# Patient Record
Sex: MALE | Race: BLACK OR AFRICAN AMERICAN | NOT HISPANIC OR LATINO | Employment: OTHER | URBAN - METROPOLITAN AREA
[De-identification: names, ages, dates, MRNs, and addresses within clinical notes are randomized per-mention and may not be internally consistent; named-entity substitution may affect disease eponyms.]

---

## 2019-08-07 ENCOUNTER — OFFICE VISIT (OUTPATIENT)
Dept: URGENT CARE | Facility: URGENT CARE | Age: 44
End: 2019-08-07

## 2019-08-07 VITALS
RESPIRATION RATE: 12 BRPM | DIASTOLIC BLOOD PRESSURE: 70 MMHG | HEART RATE: 70 BPM | TEMPERATURE: 98.1 F | BODY MASS INDEX: 26.82 KG/M2 | SYSTOLIC BLOOD PRESSURE: 110 MMHG | HEIGHT: 72 IN | WEIGHT: 198 LBS

## 2019-08-07 DIAGNOSIS — L02.433: ICD-10-CM

## 2019-08-07 DIAGNOSIS — L02.439 CARBUNCLE OF AXILLA: Primary | ICD-10-CM

## 2019-08-07 DIAGNOSIS — L81.0 POST-INFLAMMATORY HYPERPIGMENTATION: ICD-10-CM

## 2019-08-07 DIAGNOSIS — L02.434 CARBUNCLE OF ARM, LEFT: ICD-10-CM

## 2019-08-07 PROCEDURE — 99213 OFFICE O/P EST LOW 20 MIN: CPT | Performed by: INTERNAL MEDICINE

## 2019-08-07 RX ORDER — SULFAMETHOXAZOLE/TRIMETHOPRIM 800-160 MG
1 TABLET ORAL 2 TIMES DAILY
Qty: 42 TABLET | Refills: 0 | Status: SHIPPED | OUTPATIENT
Start: 2019-08-07 | End: 2020-02-04

## 2019-08-07 ASSESSMENT — ENCOUNTER SYMPTOMS: FEVER: 0

## 2019-08-07 ASSESSMENT — MIFFLIN-ST. JEOR: SCORE: 1831.12

## 2019-08-08 NOTE — PROGRESS NOTES
SUBJECTIVE:   Milan Escobar is a 43 year old male presenting with a chief complaint of   Chief Complaint   Patient presents with     Urgent Care     Derm Problem     concern of staph infection in both arms.        He is a new patient of [facility].        Onset of symptoms was 2-3 month(s) ago.    Location: arms, axillae  Bumps/lesions  Context:  Initially he thought his skin problems on his arms were related to laundry as his dryer broke    His girlfriend has had MRSA infections  His girlfriend was seen in clinic last week for axillary and vulvar carbuncles felt due to MRSA as she had MRSA from hospital stay.    Discussed at appointment that MRSA can spread in the household.  They were  given information on MRSA staph and how to prevent spread in household.  He then felt his arm lesions were most likely caught from his girlfriend  He took 4 days of Bactrim antibiotics from her supply and lesions on arms had much improved.   He also had bumps in the right axilla     Current and Associated symptoms: redness and pain  Scarring lesions on arms        Review of Systems   Constitutional: Negative for fever.       Past Medical History:   Diagnosis Date     Acne      No family history on file.  Current Outpatient Medications   Medication Sig Dispense Refill     sulfamethoxazole-trimethoprim (BACTRIM DS/SEPTRA DS) 800-160 MG tablet Take 1 tablet by mouth 2 times daily for 21 days 42 tablet 0     ALBUTEROL SULFATE 108 (90 BASE) MCG/ACT IN AERS 2 puff q 4-6 hours PRN wheezing 1 0     Finasteride, [id number], (PROPECIA PO) Take  by mouth.       isotretinoin (ACCUTANE) 40 MG capsule Take 2 capsules by mouth daily.       neomycin-bacitracin-polymyxin (NEOSPORIN) ophthalmic ointment Apply to L eye before bed 3.5 g 0     Social History     Tobacco Use     Smoking status: Former Smoker     Last attempt to quit: 2012     Years since quittin.5     Smokeless tobacco: Never Used   Substance Use Topics     Alcohol use: Not on  file       OBJECTIVE  /70   Pulse 70   Temp 98.1  F (36.7  C) (Oral)   Resp 12   Ht 1.829 m (6')   Wt 89.8 kg (198 lb)   BMI 26.85 kg/m      Physical Exam   Constitutional: He appears well-developed and well-nourished.   Skin:   Right axilla  4 tender papules, 2 are red and have broken open on their own.  Bilateral arms show multiple hyperpigmented healing lesions, scars and excoriations all circular from 1 to 3 cm in size   Vitals reviewed.      Labs:  No results found for this or any previous visit (from the past 24 hour(s)).        ASSESSMENT:      ICD-10-CM    1. Carbuncle of axilla L02.439 sulfamethoxazole-trimethoprim (BACTRIM DS/SEPTRA DS) 800-160 MG tablet   2. Carbuncle of arm, left L02.434 sulfamethoxazole-trimethoprim (BACTRIM DS/SEPTRA DS) 800-160 MG tablet   3. Post-inflammatory hyperpigmentation L81.0 sulfamethoxazole-trimethoprim (BACTRIM DS/SEPTRA DS) 800-160 MG tablet   4. Carbuncle of arm, right L02.433 sulfamethoxazole-trimethoprim (BACTRIM DS/SEPTRA DS) 800-160 MG tablet        Medical Decision Making:  Most likely patient caught MRSA staph infection from his girlfriend.  He was given a long course of antibiotics to eradicate infection.  Encouraged probiotics/yogurt to prevent resistance  Discussed bleach and chlorhexidine baths once infection seems cleared and full house cleaning with bleach    Patient Instructions           Patient Education     Staph Infection (MRSA)  Staph is the short name for the common bacteria called staphylococcus aureus. Staph bacteria are often present on the skin without causing an infection. If it gets inside the skin, an infection occurs. This causes redness, tenderness, swelling, and sometimes fluid drainage.  MRSA stands for methicillin-resistant staph aureus. Unlike a common staph infection, MRSA bacteria are resistant to the usual antibiotics and harder to treat. Also, MRSA can cause more troublesome and recurrent skin infections than common staph  bacteria. It is also more likely to spread throughout the body and cause a life-threatening illness, though this is unusual.  MRSA is spread to others by direct physical contact with the bacteria. MRSA can also be spread from items contaminated by a person who has the bacteria, such as bandages, towels, bed sheets, hard surfaces, or sports equipment. It is generally not spread through the air.  But you can get it if you come in direct contact with the fluid from someone's cough or sneeze.  Once you have a MRSA skin infection, you are at risk of having it again.  If your healthcare provider thinks you have a MRSA infection, he or she may take a wound culture to confirm the diagnosis. If you have an abscess, your provider may drain it. He or she may prescribe one or more antibiotics that work against MRSA and may recommend that you clean your skin, the skin of your closest contacts, and things that you touch or wear to get rid of chronic MRSA infection at these sites.  Home care    Take any antibiotics prescribed exactly as directed. Don't stop taking them until they are gone or your healthcare provider tells you to stop, even if you feel better.    If your healthcare provider prescribed disinfecting washes (such as chlorhexidine 4% soap) or antibiotic ointment, use it as directed.    Cover your wounds with clean, dry bandages. Change dressings as they become soiled. Wash your hands well each time you change the bandage or touch the wound.    Remove any artificial nails and nail polish.  Treating household members and your environment  If you have been diagnosed with possible MRSA infection, those living with you are at higher risk of carrying the bacteria on their skin or in their nose, even if there is no sign of infection. Bacteria must be removed from the skin of all household members at the same time so it is not passed back and forth. Advise them to remove the bacteria as follows:    Household member should wash  with chlorhexidine 4% soap as well.    If anyone in the household has a skin infection, it must be treated by a healthcare provider.    Clean counter tops, other hard surfaces that you contact, and children's toys.    Don't share personal items such as toothbrush and razors.  Preventing spread of infection    Wash your hands often with plain soap and warm water. Be sure to clean under the fingernails, between the fingers, and the wrists. Dry hands with a single use towel (for example a paper towel). If soap and water are not available, you can use an alcohol-based hand . Rub the  over the entire surface of the hands, fingers, and wrists until dry.    Don't share personal items such as towels, washcloths, razors, clothing, or uniforms. Wash soiled sheets, towels or clothes in hot water with laundry detergent. Use an automatic clothes dryer set on high to kill any remaining bacteria.    If you use a gym, wipe down equipment with an alcohol-based  before and after each use.  Wipe the handgrips as well.    If you participate in sports, shower with plain soap after every activity. Use a clean towel for each shower.  Follow-up care  Follow-up with your healthcare provider, or as advised. If a wound culture was taken, call as directed for the results. You will be told about any changes to your treatment.  If you are diagnosed with MRSA, tell medical personnel in the future that you have been treated for this type of infection.  When to seek medical advice  Call your healthcare provider if any of the following occur:    Increasing redness, swelling or pain    Red streaks in the skin around the wound    Weakness or dizziness    New appearance of pus or drainage from the wound    New fever over 100.4  F (38.0  C), or as directed by the healthcare provider  Date Last Reviewed: 4/1/2018 2000-2018 [employer]. [address] [place] [address], [place] [address]. All rights reserved. This  information is not intended as a substitute for professional medical care. Always follow your healthcare professional's instructions.           Patient Education     Understanding Carbuncles    A carbuncle is a painful boil under the skin. It happens when a group of hair follicles become infected. Follicles are the tiny holes from which hair grows out of your skin.  How to say it  Danny   What causes carbuncles?  A carbuncle is caused by an infection with the bacteria Staphylococcus aureus. They are common on areas of the body where friction and sweat occur. They usually appear on the back of the neck, back, and thighs. This type of infection can also happen when the skin is injured, such as by a cut or bug bite.  The bacteria that causes carbuncles can spread easily from person to person. People at higher risk for boils are those with diabetes or a weak immune system. Drug users who use needles are also more likely to get them.  Symptoms of carbuncles  A carbuncle starts as a small painful bump. But it can grow quickly. It may become:    Red    Swollen    Tender  Carbuncles may ooze pus. They may also cause fever and a general feeling of illness.  Treatment for carbuncles  A carbuncle may heal on its own in a few weeks. But the pus within it needs to come out first. Treatment options include:    Warm compress. Putting a warm, wet washcloth on the boil will help the pus drain out. You should never try to pop a boil. That can cause the infection to spread.    Surgical drainage. If the boil doesn t drain on its own, your healthcare provider may need to cut into it.    Antibiotics. In some cases, oral antibiotics may be prescribed to fight the infection, especially if the carbuncle returns. You will likely have to take the medicine for 5 to 7 days. You may need to take it longer for a severe case.    Good hygiene. Proper handwashing can prevent boils from spreading and coming back. Also wash things that have  been in contact with the carbuncle in hot water. This includes items such as clothing and towels.  Complications of carbuncles  The main complication of a carbuncle is the spreading of the infection. The bacteria can infect the heart and bone. It can also lead to septic shock, an infection of the entire body.  When to call your healthcare provider  Call your healthcare provider right away if you have any of these:    Fever of 100.4 F (38 C) or higher, or as directed    Redness, swelling, or fluid leaking from a carbuncle that gets worse    Pain that gets worse    Symptoms that don t get better, or get worse    New symptoms   Date Last Reviewed: 5/1/2016 2000-2018 [employer]. [address] [place] [address], [place] [address]. All rights reserved. This information is not intended as a substitute for professional medical care. Always follow your healthcare professional's instructions.

## 2019-08-08 NOTE — PATIENT INSTRUCTIONS
Patient Education     Staph Infection (MRSA)  Staph is the short name for the common bacteria called staphylococcus aureus. Staph bacteria are often present on the skin without causing an infection. If it gets inside the skin, an infection occurs. This causes redness, tenderness, swelling, and sometimes fluid drainage.  MRSA stands for methicillin-resistant staph aureus. Unlike a common staph infection, MRSA bacteria are resistant to the usual antibiotics and harder to treat. Also, MRSA can cause more troublesome and recurrent skin infections than common staph bacteria. It is also more likely to spread throughout the body and cause a life-threatening illness, though this is unusual.  MRSA is spread to others by direct physical contact with the bacteria. MRSA can also be spread from items contaminated by a person who has the bacteria, such as bandages, towels, bed sheets, hard surfaces, or sports equipment. It is generally not spread through the air.  But you can get it if you come in direct contact with the fluid from someone's cough or sneeze.  Once you have a MRSA skin infection, you are at risk of having it again.  If your healthcare provider thinks you have a MRSA infection, he or she may take a wound culture to confirm the diagnosis. If you have an abscess, your provider may drain it. He or she may prescribe one or more antibiotics that work against MRSA and may recommend that you clean your skin, the skin of your closest contacts, and things that you touch or wear to get rid of chronic MRSA infection at these sites.  Home care    Take any antibiotics prescribed exactly as directed. Don't stop taking them until they are gone or your healthcare provider tells you to stop, even if you feel better.    If your healthcare provider prescribed disinfecting washes (such as chlorhexidine 4% soap) or antibiotic ointment, use it as directed.    Cover your wounds with clean, dry bandages. Change dressings as they  become soiled. Wash your hands well each time you change the bandage or touch the wound.    Remove any artificial nails and nail polish.  Treating household members and your environment  If you have been diagnosed with possible MRSA infection, those living with you are at higher risk of carrying the bacteria on their skin or in their nose, even if there is no sign of infection. Bacteria must be removed from the skin of all household members at the same time so it is not passed back and forth. Advise them to remove the bacteria as follows:    Household member should wash with chlorhexidine 4% soap as well.    If anyone in the household has a skin infection, it must be treated by a healthcare provider.    Clean counter tops, other hard surfaces that you contact, and children's toys.    Don't share personal items such as toothbrush and razors.  Preventing spread of infection    Wash your hands often with plain soap and warm water. Be sure to clean under the fingernails, between the fingers, and the wrists. Dry hands with a single use towel (for example a paper towel). If soap and water are not available, you can use an alcohol-based hand . Rub the  over the entire surface of the hands, fingers, and wrists until dry.    Don't share personal items such as towels, washcloths, razors, clothing, or uniforms. Wash soiled sheets, towels or clothes in hot water with laundry detergent. Use an automatic clothes dryer set on high to kill any remaining bacteria.    If you use a gym, wipe down equipment with an alcohol-based  before and after each use.  Wipe the handgrips as well.    If you participate in sports, shower with plain soap after every activity. Use a clean towel for each shower.  Follow-up care  Follow-up with your healthcare provider, or as advised. If a wound culture was taken, call as directed for the results. You will be told about any changes to your treatment.  If you are diagnosed with  MRSA, tell medical personnel in the future that you have been treated for this type of infection.  When to seek medical advice  Call your healthcare provider if any of the following occur:    Increasing redness, swelling or pain    Red streaks in the skin around the wound    Weakness or dizziness    New appearance of pus or drainage from the wound    New fever over 100.4  F (38.0  C), or as directed by the healthcare provider  Date Last Reviewed: 4/1/2018 2000-2018 [employer]. [address] [place] [address]. All rights reserved. This information is not intended as a substitute for professional medical care. Always follow your healthcare professional's instructions.           Patient Education     Understanding Carbuncles    A carbuncle is a painful boil under the skin. It happens when a group of hair follicles become infected. Follicles are the tiny holes from which hair grows out of your skin.  How to say it  Danny   What causes carbuncles?  A carbuncle is caused by an infection with the bacteria Staphylococcus aureus. They are common on areas of the body where friction and sweat occur. They usually appear on the back of the neck, back, and thighs. This type of infection can also happen when the skin is injured, such as by a cut or bug bite.  The bacteria that causes carbuncles can spread easily from person to person. People at higher risk for boils are those with diabetes or a weak immune system. Drug users who use needles are also more likely to get them.  Symptoms of carbuncles  A carbuncle starts as a small painful bump. But it can grow quickly. It may become:    Red    Swollen    Tender  Carbuncles may ooze pus. They may also cause fever and a general feeling of illness.  Treatment for carbuncles  A carbuncle may heal on its own in a few weeks. But the pus within it needs to come out first. Treatment options include:    Warm compress. Putting a warm, wet washcloth on the  boil will help the pus drain out. You should never try to pop a boil. That can cause the infection to spread.    Surgical drainage. If the boil doesn t drain on its own, your healthcare provider may need to cut into it.    Antibiotics. In some cases, oral antibiotics may be prescribed to fight the infection, especially if the carbuncle returns. You will likely have to take the medicine for 5 to 7 days. You may need to take it longer for a severe case.    Good hygiene. Proper handwashing can prevent boils from spreading and coming back. Also wash things that have been in contact with the carbuncle in hot water. This includes items such as clothing and towels.  Complications of carbuncles  The main complication of a carbuncle is the spreading of the infection. The bacteria can infect the heart and bone. It can also lead to septic shock, an infection of the entire body.  When to call your healthcare provider  Call your healthcare provider right away if you have any of these:    Fever of 100.4 F (38 C) or higher, or as directed    Redness, swelling, or fluid leaking from a carbuncle that gets worse    Pain that gets worse    Symptoms that don t get better, or get worse    New symptoms   Date Last Reviewed: 5/1/2016 2000-2018 [employer]. [address] [place] [address], [place] [address]. All rights reserved. This information is not intended as a substitute for professional medical care. Always follow your healthcare professional's instructions.

## 2019-08-22 ENCOUNTER — TELEPHONE (OUTPATIENT)
Dept: URGENT CARE | Facility: URGENT CARE | Age: 44
End: 2019-08-22

## 2019-08-23 NOTE — TELEPHONE ENCOUNTER
Pt called requesting his Bactrim Rx be extended for a fourth week (he's just started 3rd week of course) and that he also be given a topical antibiotic to apply to lesions.  States lesions are larger, protruding more and some have been draining a bit.  Denies fever.      Dr. Guillory said pt needs to be seen again since lesions sound worse and pt may need to be switched to another antibiotic or even may need IV antibiotics.  Recommended pt be seen this evening or ASAP; also recommended pt take a picture of the lesions this evening for comparison if ne's unable to come in till tomorrow in UC.    I communicated all this to pt and he stated he understood and would try to come in this evening or definitely by tomorrow.    Regla Flores RN

## 2019-09-05 ENCOUNTER — OFFICE VISIT (OUTPATIENT)
Dept: URGENT CARE | Facility: URGENT CARE | Age: 44
End: 2019-09-05

## 2019-09-05 VITALS
HEART RATE: 88 BPM | DIASTOLIC BLOOD PRESSURE: 81 MMHG | WEIGHT: 198 LBS | SYSTOLIC BLOOD PRESSURE: 132 MMHG | TEMPERATURE: 98.1 F | BODY MASS INDEX: 26.85 KG/M2 | OXYGEN SATURATION: 99 %

## 2019-09-05 DIAGNOSIS — L73.9 FOLLICULITIS: Primary | ICD-10-CM

## 2019-09-05 DIAGNOSIS — Z20.818 MRSA EXPOSURE: ICD-10-CM

## 2019-09-05 PROCEDURE — 99213 OFFICE O/P EST LOW 20 MIN: CPT | Performed by: FAMILY MEDICINE

## 2019-09-05 RX ORDER — DOXYCYCLINE 100 MG/1
100 CAPSULE ORAL 2 TIMES DAILY
Qty: 60 CAPSULE | Refills: 1 | Status: SHIPPED | OUTPATIENT
Start: 2019-09-05 | End: 2020-02-04

## 2019-09-05 RX ORDER — MUPIROCIN 20 MG/G
OINTMENT TOPICAL 3 TIMES DAILY
Qty: 44 G | Refills: 1 | Status: SHIPPED | OUTPATIENT
Start: 2019-09-05 | End: 2020-02-04

## 2019-09-06 ENCOUNTER — NURSE TRIAGE (OUTPATIENT)
Dept: NURSING | Facility: CLINIC | Age: 44
End: 2019-09-06

## 2019-09-06 NOTE — TELEPHONE ENCOUNTER
"Patient states he was at [facility] yesterday and states \"I think I left my car keys there.\"    No triage completed.    Protocol-  Information Only- Adult  Care advice reviewed.   Disposition-  Information given.  Caller states understanding of the recommended disposition.   This RN spoke with Humaira (Lab) at [facility] to assist with Caller concern.   Advised Caller to go to Urgent Care today to follow up / identify keys found.  Advised to call back if further questions or concerns.     MARK GilliamN RN  [facility]     Reason for Disposition    General information question, no triage required and triager able to answer question    Protocols used: INFORMATION ONLY CALL-A-AH    "

## 2019-09-06 NOTE — PATIENT INSTRUCTIONS
Patient Education     Understanding Folliculitis  Folliculitis is when hair follicles become inflamed. Follicles are the tiny holes from which hair grows out of your skin. This skin condition can occur any place on the body where hair grows. But it s often found on the neck, face, and scalp.  How to say it  [id number]   What causes folliculitis?  An infection or irritation can cause this skin condition. It may be from bacteria or a fungus. The condition can also happen from a wound or irritation of the skin. Shaving is a common cause. Some cases may come from taking certain medicines, such as those that treat acne.  Symptoms of folliculitis  This skin condition tends to develop quickly. It looks like little pimples on a base of a red, inflamed hair follicles. These bumps may ooze pus. They may also be:    Itchy    Painful    Red    Swollen  Treatment for folliculitis  Treatment depends on the cause of the inflammation. In some cases, this skin condition will go away on its own in a few days. But it may return. Treatment options include:    Warm compress. Putting a warm, wet washcloth on the inflamed skin may help.    Medicine. Many skin (topical) and oral medicines are available. Antibiotics are used for bacterial infections. Antifungal medicines are best for fungal infections.    Good hygiene. Keeping the skin clean can help. Use a clean razor when shaving. Prevent ingrown hairs after shaving. This can reduce folliculitis in the beard area. Avoid any substances that bother your skin.  When to call your healthcare provider  Call your healthcare provider right away if you have any of these:    Fever of 100.4 F (38 C) or higher, or as directed    Pain that gets worse    Symptoms that don t get better, or get worse    New symptoms   Date Last Reviewed: 5/1/2016 2000-2018 [employer]. [address] [place] [address], [place] [address]. All rights reserved. This information is not intended as a  substitute for professional medical care. Always follow your healthcare professional's instructions.           Patient Education     Staph Infection (MRSA)  Staph is the short name for the common bacteria called staphylococcus aureus. Staph bacteria are often present on the skin without causing an infection. If it gets inside the skin, an infection occurs. This causes redness, tenderness, swelling, and sometimes fluid drainage.  MRSA stands for methicillin-resistant staph aureus. Unlike a common staph infection, MRSA bacteria are resistant to the usual antibiotics and harder to treat. Also, MRSA can cause more troublesome and recurrent skin infections than common staph bacteria. It is also more likely to spread throughout the body and cause a life-threatening illness, though this is unusual.  MRSA is spread to others by direct physical contact with the bacteria. MRSA can also be spread from items contaminated by a person who has the bacteria, such as bandages, towels, bed sheets, hard surfaces, or sports equipment. It is generally not spread through the air.  But you can get it if you come in direct contact with the fluid from someone's cough or sneeze.  Once you have a MRSA skin infection, you are at risk of having it again.  If your healthcare provider thinks you have a MRSA infection, he or she may take a wound culture to confirm the diagnosis. If you have an abscess, your provider may drain it. He or she may prescribe one or more antibiotics that work against MRSA and may recommend that you clean your skin, the skin of your closest contacts, and things that you touch or wear to get rid of chronic MRSA infection at these sites.  Home care    Take any antibiotics prescribed exactly as directed. Don't stop taking them until they are gone or your healthcare provider tells you to stop, even if you feel better.    If your healthcare provider prescribed disinfecting washes (such as chlorhexidine 4% soap) or antibiotic  ointment, use it as directed.    Cover your wounds with clean, dry bandages. Change dressings as they become soiled. Wash your hands well each time you change the bandage or touch the wound.    Remove any artificial nails and nail polish.  Treating household members and your environment  If you have been diagnosed with possible MRSA infection, those living with you are at higher risk of carrying the bacteria on their skin or in their nose, even if there is no sign of infection. Bacteria must be removed from the skin of all household members at the same time so it is not passed back and forth. Advise them to remove the bacteria as follows:    Household member should wash with chlorhexidine 4% soap as well.    If anyone in the household has a skin infection, it must be treated by a healthcare provider.    Clean counter tops, other hard surfaces that you contact, and children's toys.    Don't share personal items such as toothbrush and razors.  Preventing spread of infection    Wash your hands often with plain soap and warm water. Be sure to clean under the fingernails, between the fingers, and the wrists. Dry hands with a single use towel (for example a paper towel). If soap and water are not available, you can use an alcohol-based hand . Rub the  over the entire surface of the hands, fingers, and wrists until dry.    Don't share personal items such as towels, washcloths, razors, clothing, or uniforms. Wash soiled sheets, towels or clothes in hot water with laundry detergent. Use an automatic clothes dryer set on high to kill any remaining bacteria.    If you use a gym, wipe down equipment with an alcohol-based  before and after each use.  Wipe the handgrips as well.    If you participate in sports, shower with plain soap after every activity. Use a clean towel for each shower.  Follow-up care  Follow-up with your healthcare provider, or as advised. If a wound culture was taken, call as  directed for the results. You will be told about any changes to your treatment.  If you are diagnosed with MRSA, tell medical personnel in the future that you have been treated for this type of infection.  When to seek medical advice  Call your healthcare provider if any of the following occur:    Increasing redness, swelling or pain    Red streaks in the skin around the wound    Weakness or dizziness    New appearance of pus or drainage from the wound    New fever over 100.4  F (38.0  C), or as directed by the healthcare provider  Date Last Reviewed: 4/1/2018 2000-2018 [employer]. [address] [place] [id number]. All rights reserved. This information is not intended as a substitute for professional medical care. Always follow your healthcare professional's instructions.

## 2020-02-04 ENCOUNTER — OFFICE VISIT (OUTPATIENT)
Dept: URGENT CARE | Facility: URGENT CARE | Age: 45
End: 2020-02-04

## 2020-02-04 VITALS
WEIGHT: 198 LBS | DIASTOLIC BLOOD PRESSURE: 56 MMHG | SYSTOLIC BLOOD PRESSURE: 124 MMHG | HEIGHT: 72 IN | HEART RATE: 97 BPM | TEMPERATURE: 98.1 F | BODY MASS INDEX: 26.82 KG/M2 | OXYGEN SATURATION: 99 %

## 2020-02-04 DIAGNOSIS — R07.0 THROAT PAIN: Primary | ICD-10-CM

## 2020-02-04 DIAGNOSIS — T14.8XXA OPEN WOUND OF SKIN: ICD-10-CM

## 2020-02-04 LAB
DEPRECATED S PYO AG THROAT QL EIA: NORMAL
SPECIMEN SOURCE: NORMAL

## 2020-02-04 PROCEDURE — 87081 CULTURE SCREEN ONLY: CPT | Performed by: PHYSICIAN ASSISTANT

## 2020-02-04 PROCEDURE — 87880 STREP A ASSAY W/OPTIC: CPT | Performed by: PHYSICIAN ASSISTANT

## 2020-02-04 PROCEDURE — 99214 OFFICE O/P EST MOD 30 MIN: CPT | Performed by: PHYSICIAN ASSISTANT

## 2020-02-04 RX ORDER — DOXYCYCLINE HYCLATE 100 MG
100 TABLET ORAL 2 TIMES DAILY
Qty: 60 TABLET | Refills: 0 | Status: SHIPPED | OUTPATIENT
Start: 2020-02-04 | End: 2020-03-05

## 2020-02-04 RX ORDER — MUPIROCIN 20 MG/G
OINTMENT TOPICAL 3 TIMES DAILY
Qty: 30 G | Refills: 3 | Status: SHIPPED | OUTPATIENT
Start: 2020-02-04

## 2020-02-04 ASSESSMENT — MIFFLIN-ST. JEOR: SCORE: 1826.12

## 2020-02-05 LAB
BACTERIA SPEC CULT: NORMAL
SPECIMEN SOURCE: NORMAL

## 2020-02-05 NOTE — PROGRESS NOTES
CHIEF COMPLAINT:   Chief Complaint   Patient presents with     Urgent Care     Rash     c/o rash for 11 months       HPI: Milan Escobar is a 44 year old male who presents to clinic today for evaluation of rash.  He has a history of what he thinks is MRSA.  Had exposure from his wife.  Has been dealing with it intermittently for the past year.  In the last 2 weeks has noted a spot on his face with redness and pustules.  He occasionally has open sores, but notes that he has a tendency to pick at his wounds and scabs.  He was placed on 30 days of antibiotics in the beginning of September, noted that the rashes improved but did not completely resolve.  He has not had fever or chills.     Additionally, he has had sore throat for one day. No other symptoms.   Past Medical History:   Diagnosis Date     Acne      Asthma      Past Surgical History:   Procedure Laterality Date     KNEE SURGERY      Left knee surgery     Social History     Tobacco Use     Smoking status: Former Smoker     Last attempt to quit: 2012     Years since quittin.0     Smokeless tobacco: Never Used   Substance Use Topics     Alcohol use: Not on file     No current outpatient medications on file.     No current facility-administered medications for this visit.      No Known Allergies    10 point ROS of systems including Constitutional, Eyes, Respiratory, Cardiovascular, Gastroenterology, Genitourinary, Integumentary, Muscularskeletal, Psychiatric were all negative except for pertinent positives noted in my HPI.        Exam:  /56   Pulse 97   Temp 98.1  F (36.7  C) (Oral)   Ht 1.829 m (6')   Wt 89.8 kg (198 lb)   SpO2 99%   BMI 26.85 kg/m    Constitutional: healthy, alert and no distress  Head: Normocephalic, atraumatic.  Eyes: conjunctiva clear, no drainage  ENT: Moderately erythematous throat without trismus or drooling  Neck: neck is supple, no cervical lymphadenopathy or nuchal rigidity  Cardiovascular: RRR  Respiratory:  "CTA bilaterally, no rhonchi or rales  Skin: no rashes  Neurologic: Speech clear, gait normal. Moves all extremities.    Results for orders placed or performed in visit on 02/04/20   Strep, Rapid Screen     Status: None   Result Value Ref Range    Specimen Description Throat     Rapid Strep A Screen       NEGATIVE: No Group A streptococcal antigen detected by immunoassay, await culture report.         ASSESSMENT/PLAN:  1. Throat pain  NO evidence of PTA or RPA. Moderate amount of redness. No lesions to suggest thrush. No other cold or flu sxs. No neck pain. Requests codeine cough syrup.  He was given 800mg of IBU in clinic. He does not think that IBU will help with sore throat, and request \"something stronger\". I advised other supportive cares, salt water gargles etc. I do not think that narcotics are warranted. If he unable to swallow liquids, drooling, difficulty breathing I would like him to follow-up in ER.   - Strep, Rapid Screen      Patient with possible history of MRSA from his wife, with outbreaks over the last several years.  On exam he does have excoriation and multiple ulcerations approximately 2 to 3 cm on arms, thighs bilaterally and abdomen.  On his face, chin area has patch of erythematous open skin.  Mild oozing. He reports that he pulls the scab off after showering. Recently placed on doxycycline, but did not feel the course was long enough.  He has a tendency to pick, and I do think that the symptoms that he is experiencing are likely related to that versus significant infection. He does not have surrounding redness, swelling or systemic signs. I will start him on doxycycline again, but I do think that it is imperative that he follow-up with dermatology.   He is aggressive and agitated in clinic and requests 3+ months of antibiotics. I do not think this is reasonable. I will leave decision up to dermatology about continuing antibiotics. Possible that he was on a long course of doxycycline in the " past for acne? I am unable to locate the records.       Sveta Parr PA-C

## 2021-05-30 ENCOUNTER — RECORDS - HEALTHEAST (OUTPATIENT)
Dept: ADMINISTRATIVE | Facility: CLINIC | Age: 46
End: 2021-05-30

## 2022-01-01 ENCOUNTER — OFFICE VISIT (OUTPATIENT)
Dept: URGENT CARE | Facility: URGENT CARE | Age: 47
End: 2022-01-01
Payer: COMMERCIAL

## 2022-01-01 ENCOUNTER — PRE VISIT (OUTPATIENT)
Dept: ORTHOPEDICS | Facility: CLINIC | Age: 47
End: 2022-01-01

## 2022-01-01 ENCOUNTER — OFFICE VISIT (OUTPATIENT)
Dept: ORTHOPEDICS | Facility: CLINIC | Age: 47
End: 2022-01-01
Payer: COMMERCIAL

## 2022-01-01 ENCOUNTER — ANCILLARY PROCEDURE (OUTPATIENT)
Dept: GENERAL RADIOLOGY | Facility: CLINIC | Age: 47
End: 2022-01-01
Attending: FAMILY MEDICINE
Payer: COMMERCIAL

## 2022-01-01 ENCOUNTER — VIRTUAL VISIT (OUTPATIENT)
Dept: ORTHOPEDICS | Facility: CLINIC | Age: 47
End: 2022-01-01
Payer: COMMERCIAL

## 2022-01-01 VITALS
OXYGEN SATURATION: 98 % | BODY MASS INDEX: 29.4 KG/M2 | HEIGHT: 71 IN | DIASTOLIC BLOOD PRESSURE: 82 MMHG | WEIGHT: 210 LBS | HEART RATE: 111 BPM | SYSTOLIC BLOOD PRESSURE: 128 MMHG | TEMPERATURE: 98.1 F | RESPIRATION RATE: 16 BRPM

## 2022-01-01 VITALS — HEIGHT: 71 IN | BODY MASS INDEX: 30.1 KG/M2 | WEIGHT: 215 LBS

## 2022-01-01 DIAGNOSIS — M76.811 ANTERIOR TIBIALIS TENDINITIS, RIGHT: ICD-10-CM

## 2022-01-01 DIAGNOSIS — L01.00 IMPETIGO: Primary | ICD-10-CM

## 2022-01-01 DIAGNOSIS — Z53.9 ERRONEOUS ENCOUNTER--DISREGARD: Primary | ICD-10-CM

## 2022-01-01 DIAGNOSIS — M25.571 RIGHT ANKLE PAIN, UNSPECIFIED CHRONICITY: ICD-10-CM

## 2022-01-01 DIAGNOSIS — M25.571 RIGHT ANKLE PAIN, UNSPECIFIED CHRONICITY: Primary | ICD-10-CM

## 2022-01-01 PROCEDURE — 99213 OFFICE O/P EST LOW 20 MIN: CPT | Performed by: PHYSICIAN ASSISTANT

## 2022-01-01 PROCEDURE — 73610 X-RAY EXAM OF ANKLE: CPT | Mod: RT | Performed by: RADIOLOGY

## 2022-01-01 PROCEDURE — 99203 OFFICE O/P NEW LOW 30 MIN: CPT | Mod: GC | Performed by: FAMILY MEDICINE

## 2022-01-01 RX ORDER — MUPIROCIN 20 MG/G
OINTMENT TOPICAL 3 TIMES DAILY
Qty: 90 G | Refills: 0 | Status: SHIPPED | OUTPATIENT
Start: 2022-01-01

## 2022-01-01 RX ORDER — CEPHALEXIN 500 MG/1
500 CAPSULE ORAL 4 TIMES DAILY
Qty: 56 CAPSULE | Refills: 0 | Status: SHIPPED | OUTPATIENT
Start: 2022-01-01 | End: 2022-01-01

## 2022-01-01 ASSESSMENT — MIFFLIN-ST. JEOR: SCORE: 1877.36

## 2022-02-10 NOTE — TELEPHONE ENCOUNTER
DIAGNOSIS: RIGHT ankle pain / SELF (previous pt of MD Jimmy) / [facility] (Subscriber ID [id number], entered) / No current imaging   APPOINTMENT DATE: 2.12.22   NOTES STATUS DETAILS   MEDICATION LIST Internal

## 2022-02-12 NOTE — PROGRESS NOTES
"[facility]  Sports Medicine Clinic  Clinics and Surgery Center           SUBJECTIVE       Milan Escobar is a 46 year old male presenting to clinic today with a chief complaint of right ankle pain.    Right ankle pain over the last 2 weeks. Doesn't recall any acute injury. Feels like pain is inside the ankle joint.  At rest pain is 0/10, however whenever ambulating, patient's pain is 1/10 at minimum. Worse with recent increased walking at work, stairs and certain pivoting movements that will occasionally have tweaks up to a 10/10, but resolves instantly after taking weight off.     Now his anterior lower leg is bothering him and R knee is bothering too.     Usually wears shoe with good arch support--  Newer steel toe shoes at work but overall they feel relatively comfortable.    Swelling seems to be improved. Using ACE wraps which helps  Lidocaine spray helps with pain. No tylenol, ibuprofen.     Has had some injuries to both ankles in past from football, but no surgery. Would get shin pain similar to this as well.    PMH, Medications and Allergies were reviewed and updated as needed.    ROS:  As noted above otherwise negative.    There is no problem list on file for this patient.      Current Outpatient Medications   Medication Sig Dispense Refill     mupirocin (BACTROBAN) 2 % external ointment Apply topically 3 times daily 30 g 3            OBJECTIVE:       Vitals:   Vitals:    02/12/22 0920   Weight: 97.5 kg (215 lb)   Height: 1.803 m (5' 11\")     BMI: Body mass index is 29.99 kg/m .    Gen:  Well nourished and in no acute distress  HEENT: Extraocular movement intact  Neck: Supple  Pulm:  Breathing Comfortably. No increased respiratory effort.  Psych: Euthymic. Appropriately answers questions    MSK:   R Knee:normal appearance, no swelling, full range of motion, mild tenderness along AT muscle body inferior to patella  R Lower leg:normal appearance, normal on palpation, normal gastroc-soleus muscle " complex    R ANKLE  Inspection:Swelling:mild anterior soft tissue swelling   Tender: mild tenderness of AT tendon  Non-tender:ATFL, CFL, PTFL, lateral malleolus, medial malleolus, deltoid ligament, achilles tendon, 5th metatarsal base  Range of Motion: dorsiflexion:  full, painful, plantarflexion:  full, inversion:  full, eversion:  full  Strength:dorsiflexion:  5/5, painful, plantarflexion:  5/5, inversion: 5/5, eversion:5/5  Special tests:negative anterior drawer, negative talar tilt though with crepetis  Stance: pes cavus weightbearing  Antalgic gait, favoring L foot    R FOOT  foot exam : Inspection Palpation:   Swelling: no swelling  Non-tender: diffusely  Range of Motion:flexion of toes:  full, extension of toes  full        POCUS R ankle:  Edema within anterior tibial tendon and mild edema in posterior tibial tendon. No joint effusion. Chronic degenerative changes of ATFL.      XRAY  3 views right ankle radiographs 2/12/2022 10:34 AM    Comparison: None     Findings:   Standing AP, oblique, and lateral  views of the right ankle were  obtained.   No acute osseous abnormality.    Ankle mortise and syndesmosis are congruent on this weight bearing  study. Mild tibiotalar spurring.  Soft tissue is unremarkable.                                                                   Impression:  1. No acute osseous abnormality.  2. Mild tibiotalar degenerative change.          ASSESSMENT and PLAN:     Milan was seen today for pain.    Diagnoses and all orders for this visit:    Right ankle pain, unspecified chronicity  -     XR Ankle Right G/E 3 Views; Future  -     Cane Order for DME - ONLY FOR DME    Anterior tibialis tendinitis, right    Patient with anterior ankle pain likely related to anterior tibialis tendinopathy given history of recent increased use pain with dorsiflexion and edema of tendon on POCUS. X-rays also show mild tibiotalar degenerative change though no effusion on exam or POCUS which would make this  less likely.  Patient was fitted with short walking boot for 2 weeks when ambulating.  Patient also asked for order for cane as this is helped him recently but he lost his.  We discussed short consistent course of anti-inflammatory medication.  Patient prefers not to take any oral medications but is willing to try over-the-counter diclofenac gel as needed.  We also discussed icing for up to 15 minutes 3 times per day plan to have telephone visit in 2 weeks if pain not improving and likely referral to PT and possible MRI    Return to clinic in 2 weeks for follow up of ankle pain. Return sooner if develops new or worsening symptoms.    Options for treatment and/or follow-up care were reviewed with the patient was actively involved in the decision making process. Patient verbalized understanding and was in agreement with the plan.    The patient was seen by and discussed with Dr.Suzanne DIEGO Marquez MD, CAQ, CCD    Isaías Manzano MD  Family Medicine Resident--PGY 2  [facility]/ [facility]

## 2022-02-12 NOTE — PROGRESS NOTES
"[facility]  Sports Medicine Clinic  Clinics and Surgery Center           SUBJECTIVE       Milan Escobar is a 46 year old male presenting to clinic today with a chief complaint of right ankle pain.    Right ankle pain over the last 2 weeks. Doesn't recall any acute injury. Feels like pain is inside the ankle joint.  At rest pain is 0/10, however whenever ambulating, patient's pain is 1/10 at minimum. Worse with recent increased walking at work, stairs and certain pivoting movements that will occasionally have tweaks up to a 10/10, but resolves instantly after taking weight off.     Now his anterior lower leg is bothering him and R knee is bothering too.     Usually wears shoe with good arch support--  Newer steel toe shoes at work but overall they feel relatively comfortable.    Swelling seems to be improved. Using ACE wraps which helps  Lidocaine spray helps with pain. No tylenol, ibuprofen.     Has had some injuries to both ankles in past from football, but no surgery. Would get shin pain similar to this as well.    PMH, Medications and Allergies were reviewed and updated as needed.    ROS:  As noted above otherwise negative.    There is no problem list on file for this patient.      Current Outpatient Medications   Medication Sig Dispense Refill     mupirocin (BACTROBAN) 2 % external ointment Apply topically 3 times daily 30 g 3            OBJECTIVE:       Vitals:   Vitals:    02/12/22 0920   Weight: 97.5 kg (215 lb)   Height: 1.803 m (5' 11\")     BMI: Body mass index is 29.99 kg/m .    Gen:  Well nourished and in no acute distress  HEENT: Extraocular movement intact  Neck: Supple  Pulm:  Breathing Comfortably. No increased respiratory effort.  Psych: Euthymic. Appropriately answers questions    MSK:   R Knee:normal appearance, no swelling, full range of motion, mild tenderness along AT muscle body inferior to patella  R Lower leg:normal appearance, normal on palpation, normal gastroc-soleus muscle " complex    R ANKLE  Inspection:Swelling:mild anterior soft tissue swelling   Tender: mild tenderness of AT tendon  Non-tender:ATFL, CFL, PTFL, lateral malleolus, medial malleolus, deltoid ligament, achilles tendon, 5th metatarsal base  Range of Motion: dorsiflexion:  full, painful, plantarflexion:  full, inversion:  full, eversion:  full  Strength:dorsiflexion:  5/5, painful, plantarflexion:  5/5, inversion: 5/5, eversion:5/5  Special tests:negative anterior drawer, negative talar tilt though with crepetis  Stance: pes cavus weightbearing  Antalgic gait, favoring L foot    R FOOT  foot exam : Inspection Palpation:   Swelling: no swelling  Non-tender: diffusely  Range of Motion:flexion of toes:  full, extension of toes  full        POCUS R ankle:  Edema within anterior tibial tendon and mild edema in posterior tibial tendon. No joint effusion. Chronic degenerative changes of ATFL.      XRAY  3 views right ankle radiographs 2/12/2022 10:34 AM    Comparison: None     Findings:   Standing AP, oblique, and lateral  views of the right ankle were  obtained.   No acute osseous abnormality.    Ankle mortise and syndesmosis are congruent on this weight bearing  study. Mild tibiotalar spurring.  Soft tissue is unremarkable.                                                                   Impression:  1. No acute osseous abnormality.  2. Mild tibiotalar degenerative change.          ASSESSMENT and PLAN:     Milan was seen today for pain.    Diagnoses and all orders for this visit:    Right ankle pain, unspecified chronicity  -     XR Ankle Right G/E 3 Views; Future  -     Cane Order for DME - ONLY FOR DME    Anterior tibialis tendinitis, right    Patient with anterior ankle pain likely related to anterior tibialis tendinopathy given history of recent increased use pain with dorsiflexion and edema of tendon on POCUS. X-rays also show mild tibiotalar degenerative change though no effusion on exam or POCUS which would make this  less likely.  Patient was fitted with short walking boot for 2 weeks when ambulating.  Patient also asked for order for cane as this is helped him recently but he lost his.  We discussed short consistent course of anti-inflammatory medication.  Patient prefers not to take any oral medications but is willing to try over-the-counter diclofenac gel as needed.  We also discussed icing for up to 15 minutes 3 times per day plan to have telephone visit in 2 weeks if pain not improving and likely referral to PT and possible MRI    Return to clinic in 2 weeks for follow up of ankle pain. Return sooner if develops new or worsening symptoms.    Options for treatment and/or follow-up care were reviewed with the patient was actively involved in the decision making process. Patient verbalized understanding and was in agreement with the plan.    The patient was seen by and discussed with Dr.Suzanne DIEGO Marquez MD, CAQ, CCD    Isaías Manzano MD  Family Medicine Resident--PGY 2  [facility]/ [facility]

## 2022-02-12 NOTE — PATIENT INSTRUCTIONS
1. For pain in your ankle, you can use Voltaren (diclofenac) gel up to four times per day as needed.  2. Icing for up to 15 minutes three times per day.  3. Use walking boot when walking for the next 2 weeks, then we will follow up with a telephone visit for re-evaluation.

## 2022-02-12 NOTE — LETTER
"  2/12/2022      RE: Milan Escobar  [address]  [facility]  [address] [address]  [place] [address]       [facility]  Sports Medicine Clinic  [facility]           SUBJECTIVE       Milan Escobar is a 46 year old male presenting to clinic today with a chief complaint of right ankle pain.    Right ankle pain over the last 2 weeks. Doesn't recall any acute injury. Feels like pain is inside the ankle joint.  At rest pain is 0/10, however whenever ambulating, patient's pain is 1/10 at minimum. Worse with recent increased walking at work, stairs and certain pivoting movements that will occasionally have tweaks up to a 10/10, but resolves instantly after taking weight off.     Now his anterior lower leg is bothering him and R knee is bothering too.     Usually wears shoe with good arch support--  Newer steel toe shoes at work but overall they feel relatively comfortable.    Swelling seems to be improved. Using ACE wraps which helps  Lidocaine spray helps with pain. No tylenol, ibuprofen.     Has had some injuries to both ankles in past from football, but no surgery. Would get shin pain similar to this as well.    PMH, Medications and Allergies were reviewed and updated as needed.    ROS:  As noted above otherwise negative.    There is no problem list on file for this patient.      Current Outpatient Medications   Medication Sig Dispense Refill     mupirocin (BACTROBAN) 2 % external ointment Apply topically 3 times daily 30 g 3            OBJECTIVE:       Vitals:   Vitals:    02/12/22 0920   Weight: 97.5 kg (215 lb)   Height: 1.803 m (5' 11\")     BMI: Body mass index is 29.99 kg/m .    Gen:  Well nourished and in no acute distress  HEENT: Extraocular movement intact  Neck: Supple  Pulm:  Breathing Comfortably. No increased respiratory effort.  Psych: Euthymic. Appropriately answers questions    MSK:   R Knee:normal appearance, no swelling, full range of motion, mild tenderness along AT muscle " body inferior to patella  R Lower leg:normal appearance, normal on palpation, normal gastroc-soleus muscle complex    R ANKLE  Inspection:Swelling:mild anterior soft tissue swelling   Tender: mild tenderness of AT tendon  Non-tender:ATFL, CFL, PTFL, lateral malleolus, medial malleolus, deltoid ligament, achilles tendon, 5th metatarsal base  Range of Motion: dorsiflexion:  full, painful, plantarflexion:  full, inversion:  full, eversion:  full  Strength:dorsiflexion:  5/5, painful, plantarflexion:  5/5, inversion: 5/5, eversion:5/5  Special tests:negative anterior drawer, negative talar tilt though with crepetis  Stance: pes cavus weightbearing  Antalgic gait, favoring L foot    R FOOT  foot exam : Inspection Palpation:   Swelling: no swelling  Non-tender: diffusely  Range of Motion:flexion of toes:  full, extension of toes  full        POCUS R ankle:  Edema within anterior tibial tendon and mild edema in posterior tibial tendon. No joint effusion. Chronic degenerative changes of ATFL.      XRAY  3 views right ankle radiographs 2/12/2022 10:34 AM    Comparison: None     Findings:   Standing AP, oblique, and lateral  views of the right ankle were  obtained.   No acute osseous abnormality.    Ankle mortise and syndesmosis are congruent on this weight bearing  study. Mild tibiotalar spurring.  Soft tissue is unremarkable.                                                                   Impression:  1. No acute osseous abnormality.  2. Mild tibiotalar degenerative change.          ASSESSMENT and PLAN:     Milan was seen today for pain.    Diagnoses and all orders for this visit:    Right ankle pain, unspecified chronicity  -     XR Ankle Right G/E 3 Views; Future  -     Cane Order for DME - ONLY FOR DME    Anterior tibialis tendinitis, right    Patient with anterior ankle pain likely related to anterior tibialis tendinopathy given history of recent increased use pain with dorsiflexion and edema of tendon on POCUS. X-rays  also show mild tibiotalar degenerative change though no effusion on exam or POCUS which would make this less likely.  Patient was fitted with short walking boot for 2 weeks when ambulating.  Patient also asked for order for cane as this is helped him recently but he lost his.  We discussed short consistent course of anti-inflammatory medication.  Patient prefers not to take any oral medications but is willing to try over-the-counter diclofenac gel as needed.  We also discussed icing for up to 15 minutes 3 times per day plan to have telephone visit in 2 weeks if pain not improving and likely referral to PT and possible MRI    Return to clinic in 2 weeks for follow up of ankle pain. Return sooner if develops new or worsening symptoms.    Options for treatment and/or follow-up care were reviewed with the patient was actively involved in the decision making process. Patient verbalized understanding and was in agreement with the plan.    The patient was seen by and discussed with Dr.Suzanne DIEGO Marquez MD, CAQ, CCD    Isaías Manzano MD  Family Medicine Resident--PGY 2  [facility]        Attending Note:   I have personally examined this patient and have reviewed the clinical presentation and progress note with the resident. I agree with the treatment plan as outlined. The plan was formulated with the resident on the day of the patient's visit. I have reviewed all imaging and/or labs with the resident and agree with the findings in the documentation.     Jennifer Marquez MD, CAQ, CCD  [facility]  Sports Medicine and Bone Health      Jennifer Marquez MD

## 2022-02-15 NOTE — PROGRESS NOTES
Attending Note:   I have personally examined this patient and have reviewed the clinical presentation and progress note with the resident. I agree with the treatment plan as outlined. The plan was formulated with the resident on the day of the patient's visit. I have reviewed all imaging and/or labs with the resident and agree with the findings in the documentation.     Jennifer Marquez MD, CAQ, CCD  [facility]  Sports Medicine and Bone Health

## 2022-02-23 NOTE — LETTER
2/23/2022       RE: Milan Escobar  [address]  [facility]  [address] [address]  [place] [address]     Dear Colleague,    Thank you for referring your patient, Milan Escobar, to the [facility]. Please see a copy of my visit note below.    No show    Jennifer Marquez MD, CAQ, FACSM, CCD  [facility]  Sports Medicine and Bone Health  Team Physician;  Athletics        Again, thank you for allowing me to participate in the care of your patient.      Sincerely,    Jennifer Marquez MD

## 2022-02-23 NOTE — LETTER
2/23/2022      RE: Milan Escobar  [address]  [facility]  [address] [address]  [place] [address]       No show    Jennifer Marquez MD, CAQ, FACSM, CCD  [facility]  Sports Medicine and Bone Health  Team Physician;  Athletics        Jennifer Marquez MD

## 2022-02-25 NOTE — PROGRESS NOTES
No show    Jennifer Marquez MD, CAQ, FACSM, CCD  [facility]  Sports Medicine and Bone Health  Team Physician;  Athletics

## 2022-09-14 NOTE — PROGRESS NOTES
Impetigo  - mupirocin (BACTROBAN) 2 % external ointment; Apply topically 3 times daily  - cephALEXin (KEFLEX) 500 MG capsule; Take 1 capsule (500 mg) by mouth 4 times daily for 14 days        Understanding Impetigo  Impetigo is a common bacterial infection of the skin. It most often affects the face, arms, and legs. But it can appear on any part of the body. Anyone can have it, regardless of age. But it's most common in children. Impetigo is very contagious. This means it spreads easily to other people.    How to say it  [id number]  What causes impetigo?   Many types of bacteria live on normal, healthy skin. The bacteria usually don t cause problems. Impetigo happens when bacteria enter the skin through a scratch, break, sore, bite, or irritated spot. They then begin to grow out of control, leading to infection. The two most common bacteria causing impetigo are Staphylococcus and Streptococcus. In certain cases, impetigo appears on skin that has no visible break. It may be more likely to occur on skin that has another skin problem, such as eczema. It may also be more common after a cold or other virus.   Symptoms of impetigo   Symptoms of this problem include:    Small, fluid-filled blisters on the skin that may itch, ooze, or crust    A yellow, honey-colored crust on the infected skin    Skin sores that spread with scratching    An itchy rash that spreads with scratching    Swollen lymph nodes  Treatment for impetigo   The goal is to treat the infection and prevent it from spreading to others.    You will likely be given an antibiotic to treat the infection. This may be a cream or ointment to put on your skin. You usually need to use the cream or ointment for about 5 days. If the infection is severe or spreading, you may be given antibiotic medicine to take by mouth. Be sure to use this medicine as directed. Don't stop using it until you are told to stop, even if your skin gets better. If you stop too soon, the  infection may come back and be harder to treat.    Try not to scratch or pick at your sores. It may help to cover affected areas with a bandage.    To prevent spreading the infection, wash your hands often. Avoid sharing personal items, towels, clothes, pillows, and sheets with others. After each use, wash these items in hot water.    Clean the affected skin several times a day. Don t scrub. Instead, soak the area in warm, soapy water. This will help remove the crust that forms. For places that you can't soak, such as the face, place a clean, warm (not hot) washcloth on the affected area. Use a new washcloth and towel each time.  When to call your healthcare provider   Call your healthcare provider right away if you have any of these:    Fever of 100.4 F (38 C) or higher, or as directed by your healthcare provider    Increasing number of sores or spreading areas of redness after 2 days of treatment with antibiotics    Increasing swelling or pain    Increased amounts of fluid or pus coming from the sores    Unusual drowsiness, weakness, or change in behavior    Loss of appetite or vomiting  [employer] last reviewed this educational content on 6/1/2019 2000-2021 [employer]. All rights reserved. This information is not intended as a substitute for professional medical care. Always follow your healthcare professional's instructions.                 Shaheed Winchester PA-C  [facility] URGENT CARE    Subjective   46 year old who presents to clinic today for the following health issues:    Urgent Care and Derm Problem       HPI     Rash  Onset/Duration: 2 days  Description  Location: right side of head  Character: itchy  Itching: mild  Intensity:  moderate  Progression of Symptoms:  worsening  Accompanying signs and symptoms:   Fever: No  Body aches or joint pain: No  Sore throat symptoms: No  Recent cold symptoms: No  History:           Previous episodes of similar rash: None  New exposures:   None  Recent travel: No  Exposure to similar rash: Yes  Therapies tried and outcome: none    Review of Systems   Review of Systems   See HPI     Objective    Temp: 98.1  F (36.7  C) Temp src: Temporal BP: 128/82 Pulse: 111   Resp: 16 SpO2: 98 %       Physical Exam   Physical Exam  Constitutional:       General: He is not in acute distress.     Appearance: Normal appearance. He is normal weight. He is not ill-appearing, toxic-appearing or diaphoretic.   HENT:      Head: Normocephalic and atraumatic.   Cardiovascular:      Rate and Rhythm: Normal rate.      Pulses: Normal pulses.   Pulmonary:      Effort: Pulmonary effort is normal. No respiratory distress.   Skin:            Comments: Patient has a dry honey crusted erythematous rash in the area shown above.   Neurological:      General: No focal deficit present.      Mental Status: He is alert and oriented to person, place, and time. Mental status is at baseline.      Gait: Gait normal.   Psychiatric:         Mood and Affect: Mood normal.         Behavior: Behavior normal.         Thought Content: Thought content normal.         Judgment: Judgment normal.          No results found for this or any previous visit (from the past 24 hour(s)).